# Patient Record
Sex: FEMALE | URBAN - METROPOLITAN AREA
[De-identification: names, ages, dates, MRNs, and addresses within clinical notes are randomized per-mention and may not be internally consistent; named-entity substitution may affect disease eponyms.]

---

## 2018-04-04 ENCOUNTER — HOSPITAL ENCOUNTER (EMERGENCY)
Facility: HOSPITAL | Age: 78
Discharge: HOME | End: 2018-04-05

## 2018-04-04 DIAGNOSIS — I44.7 LEFT BUNDLE BRANCH BLOCK (LBBB): ICD-10-CM

## 2018-04-04 DIAGNOSIS — R10.9 ACUTE RIGHT FLANK PAIN: Primary | ICD-10-CM

## 2018-04-04 DIAGNOSIS — R07.9 CHEST PAIN: ICD-10-CM

## 2018-04-04 PROCEDURE — 93010 ELECTROCARDIOGRAM REPORT: CPT | Mod: ,,, | Performed by: INTERNAL MEDICINE

## 2018-04-04 PROCEDURE — 93005 ELECTROCARDIOGRAM TRACING: CPT

## 2018-04-04 PROCEDURE — 99285 EMERGENCY DEPT VISIT HI MDM: CPT | Mod: 25

## 2018-04-04 PROCEDURE — 96374 THER/PROPH/DIAG INJ IV PUSH: CPT

## 2018-04-05 VITALS
RESPIRATION RATE: 16 BRPM | OXYGEN SATURATION: 98 % | HEART RATE: 73 BPM | WEIGHT: 175 LBS | HEIGHT: 65 IN | DIASTOLIC BLOOD PRESSURE: 72 MMHG | SYSTOLIC BLOOD PRESSURE: 123 MMHG | TEMPERATURE: 98 F | BODY MASS INDEX: 29.16 KG/M2

## 2018-04-05 LAB
ALBUMIN SERPL BCP-MCNC: 4.4 G/DL
ALP SERPL-CCNC: 57 U/L
ALT SERPL W/O P-5'-P-CCNC: 19 U/L
ANION GAP SERPL CALC-SCNC: 14 MMOL/L
AST SERPL-CCNC: 24 U/L
BASOPHILS # BLD AUTO: 0.02 K/UL
BASOPHILS NFR BLD: 0.2 %
BILIRUB SERPL-MCNC: 0.2 MG/DL
BILIRUB UR QL STRIP: NEGATIVE
BUN SERPL-MCNC: 20 MG/DL
CALCIUM SERPL-MCNC: 10.2 MG/DL
CHLORIDE SERPL-SCNC: 103 MMOL/L
CLARITY UR: CLEAR
CO2 SERPL-SCNC: 21 MMOL/L
COLOR UR: YELLOW
CREAT SERPL-MCNC: 1.2 MG/DL
DIFFERENTIAL METHOD: ABNORMAL
EOSINOPHIL # BLD AUTO: 0.5 K/UL
EOSINOPHIL NFR BLD: 5.3 %
ERYTHROCYTE [DISTWIDTH] IN BLOOD BY AUTOMATED COUNT: 12.6 %
EST. GFR  (AFRICAN AMERICAN): 49 ML/MIN/1.73 M^2
EST. GFR  (NON AFRICAN AMERICAN): 43 ML/MIN/1.73 M^2
GLUCOSE SERPL-MCNC: 94 MG/DL
GLUCOSE UR QL STRIP: NEGATIVE
HCT VFR BLD AUTO: 37.6 %
HGB BLD-MCNC: 12.3 G/DL
HGB UR QL STRIP: NEGATIVE
KETONES UR QL STRIP: NEGATIVE
LEUKOCYTE ESTERASE UR QL STRIP: NEGATIVE
LYMPHOCYTES # BLD AUTO: 2.8 K/UL
LYMPHOCYTES NFR BLD: 33.1 %
MCH RBC QN AUTO: 30.6 PG
MCHC RBC AUTO-ENTMCNC: 32.7 G/DL
MCV RBC AUTO: 94 FL
MONOCYTES # BLD AUTO: 0.6 K/UL
MONOCYTES NFR BLD: 6.9 %
NEUTROPHILS # BLD AUTO: 4.6 K/UL
NEUTROPHILS NFR BLD: 54.3 %
NITRITE UR QL STRIP: NEGATIVE
PH UR STRIP: 7 [PH] (ref 5–8)
PLATELET # BLD AUTO: 257 K/UL
PMV BLD AUTO: 9.1 FL
POTASSIUM SERPL-SCNC: 4.7 MMOL/L
PROT SERPL-MCNC: 8.5 G/DL
PROT UR QL STRIP: NEGATIVE
RBC # BLD AUTO: 4.02 M/UL
SODIUM SERPL-SCNC: 138 MMOL/L
SP GR UR STRIP: <=1.005 (ref 1–1.03)
TROPONIN I SERPL DL<=0.01 NG/ML-MCNC: <0.006 NG/ML
URN SPEC COLLECT METH UR: ABNORMAL
UROBILINOGEN UR STRIP-ACNC: NEGATIVE EU/DL
WBC # BLD AUTO: 8.43 K/UL

## 2018-04-05 PROCEDURE — 85025 COMPLETE CBC W/AUTO DIFF WBC: CPT

## 2018-04-05 PROCEDURE — 93005 ELECTROCARDIOGRAM TRACING: CPT

## 2018-04-05 PROCEDURE — 63600175 PHARM REV CODE 636 W HCPCS

## 2018-04-05 PROCEDURE — 81003 URINALYSIS AUTO W/O SCOPE: CPT

## 2018-04-05 PROCEDURE — 80053 COMPREHEN METABOLIC PANEL: CPT

## 2018-04-05 PROCEDURE — 84484 ASSAY OF TROPONIN QUANT: CPT

## 2018-04-05 PROCEDURE — 93010 ELECTROCARDIOGRAM REPORT: CPT | Mod: ,,, | Performed by: INTERNAL MEDICINE

## 2018-04-05 RX ORDER — ASPIRIN 325 MG
325 TABLET, DELAYED RELEASE (ENTERIC COATED) ORAL DAILY
Refills: 0 | COMMUNITY
Start: 2018-04-05 | End: 2019-04-05

## 2018-04-05 RX ORDER — LIDOCAINE 50 MG/G
1 PATCH TOPICAL DAILY
Qty: 7 PATCH | Refills: 0 | Status: SHIPPED | OUTPATIENT
Start: 2018-04-05

## 2018-04-05 RX ORDER — KETOROLAC TROMETHAMINE 30 MG/ML
15 INJECTION, SOLUTION INTRAMUSCULAR; INTRAVENOUS
Status: COMPLETED | OUTPATIENT
Start: 2018-04-05 | End: 2018-04-05

## 2018-04-05 RX ADMIN — KETOROLAC TROMETHAMINE 15 MG: 30 INJECTION, SOLUTION INTRAMUSCULAR at 01:04

## 2018-04-05 NOTE — ED PROVIDER NOTES
Encounter Date: 4/4/2018    SCRIBE #1 NOTE: I, Michael Colon, am scribing for, and in the presence of,  Dr. Steiner. I have scribed the entire note.       History     Chief Complaint   Patient presents with    Abdominal Pain     ruq abdominal pain     Time seen by provider: 10:49 PM    This is a 78 y.o. female who presents to the ED via EMS with complaint of sudden onset right flank pain this evening. Patient reports that her flank pain was radiating to her chest, but her pain has since resolved. EMS activated, and pre-hospital EKG shows sinus rhythm LBBB. The patient denies any SOB, nausea, or other concerning symptoms. Per dastamra, patient was given baby aspirin with some relief prior to arrival to Ed. The patient was comfortable upon EMS arrival, and appears comfortable in the ED. No other palliative or provocative factors except as noted.    The history of this patient was limited due to a language barrier. Patient's daughter was able to interpret between [place] and English.          Review of patient's allergies indicates:  No Known Allergies  No past medical history on file.  No past surgical history on file.  No family history on file.  Social History   Substance Use Topics    Smoking status: Not on file    Smokeless tobacco: Not on file    Alcohol use Not on file     Review of Systems   Respiratory: Negative for shortness of breath.    Cardiovascular: Positive for chest pain.   Gastrointestinal: Negative for nausea and vomiting.   Genitourinary: Positive for flank pain.   All other systems reviewed and are negative.      Physical Exam     Initial Vitals [04/04/18 2230]   BP Pulse Resp Temp SpO2   126/73 88 20 97.2 °F (36.2 °C) 99 %      MAP       90.67         Physical Exam    Nursing note and vitals reviewed.  Constitutional: She appears well-developed.   HENT:   Head: Normocephalic and atraumatic.   Eyes: EOM are normal.   Neck: Neck supple.   Cardiovascular: Normal rate and regular rhythm.    Pulmonary/Chest: Breath sounds normal. No respiratory distress.   Abdominal: Soft. There is no tenderness.   Abdomen soft, nontender   Musculoskeletal: Normal range of motion.   No acute deformity   Neurological: She is alert and oriented to person, place, and time.   Skin: Skin is warm and dry.   Psychiatric: She has a normal mood and affect.         ED Course   Procedures  Labs Reviewed   CBC W/ AUTO DIFFERENTIAL - Abnormal; Notable for the following:        Result Value    MPV 9.1 (*)     All other components within normal limits   COMPREHENSIVE METABOLIC PANEL - Abnormal; Notable for the following:     CO2 21 (*)     Total Protein 8.5 (*)     eGFR if  49 (*)     eGFR if non  43 (*)     All other components within normal limits   URINALYSIS - Abnormal; Notable for the following:     Specific Gravity, UA <=1.005 (*)     All other components within normal limits   TROPONIN I     EKG Readings: (Independently Interpreted)   Sinus rhythm at 93 bpm, , no acute concerning St or T wave changes, LBBB, Sgarbossa score 0; no significant changes from pre-hospital EKG performed approximately 40 minutes PTA; abnormal EKG    Repeat EKG at 2:08 AM  76 bpm, ; unchanged from most prior EKG     Imaging Results          CT Renal Stone Study ABD Pelvis WO (Final result)  Result time 04/05/18 02:31:52    Final result by Lalitha Spence MD (04/05/18 02:31:52)                 Impression:      1. No acute abnormality identified on today's examination.  Specifically, no evidence of obstructive uropathy.  2. Mild hepatomegaly.  3. Multiple additional findings as above.      Electronically signed by: Lalitha Spence MD  Date:    04/05/2018  Time:    02:31             Narrative:    EXAMINATION:  CT RENAL STONE STUDY ABD PELVIS WO    CLINICAL HISTORY:  Flank pain, stone disease suspected;    TECHNIQUE:  Low dose axial images, sagittal and coronal reformations were obtained from the lung bases to  the pubic symphysis.  Contrast was not administered.    COMPARISON:  None    FINDINGS:  The visualized lung bases are free of pleural fluid or focal consolidation.  The visualized portions of the heart and pericardium are unremarkable.    Evaluation of solid organ parenchyma is limited due to noncontrast technique.  The liver is prominent in size measuring approximately 17 cm in craniocaudad dimension.  No focal hepatic abnormality is identified.  The gallbladder demonstrates no evidence of radiopaque stones.  There is no intra or extrahepatic biliary ductal dilatation.  The spleen, pancreas, and adrenal glands demonstrate an unremarkable noncontrast CT appearance.    The kidneys are normal in size and location.  The ureters are normal in caliber.  There is no evidence of hydroureteronephrosis or nephroureterolithiasis.  There are multiple pelvic phleboliths present.  The uterus and adnexal regions are within normal limits.  The urinary bladder is unremarkable.    The abdominal aorta is normal in course and caliber with minimal scattered calcific atherosclerosis.  There is no bulky abdominopelvic adenopathy.  The visualized loops of large and small bowel demonstrate no evidence of obstruction or inflammatory change.  The appendix is unremarkable.  Incidental note is made of diastasis of the abdominal wall musculature.  There is no ascites, free intraperitoneal air or portal venous gas.    The visualized osseous structures demonstrate degenerative change without acute abnormality.  Extraperitoneal soft tissues demonstrate multiple calcifications within the bilateral gluteal soft tissues, possibly relating to injection granulomas.                               X-Ray Chest AP Portable (Final result)  Result time 04/05/18 02:22:19    Final result by Lalitha Spence MD (04/05/18 02:22:19)                 Impression:      No acute intrathoracic abnormality identified on this single radiographic view of the  chest.      Electronically signed by: Lalitha Spence MD  Date:    04/05/2018  Time:    02:22             Narrative:    EXAMINATION:  XR CHEST AP PORTABLE    CLINICAL HISTORY:  Chest pain, unspecified    TECHNIQUE:  Single frontal view of the chest was performed.    COMPARISON:  None    FINDINGS:  Cardiac monitoring leads overlie the chest.  The cardiomediastinal silhouette is likely within normal limits allowing for accentuation by portable AP technique.  Visualized airway is unremarkable.  The lungs appear symmetrically aerated without definite focal alveolar consolidation. No large pleural effusion or pneumothorax.  The visualized osseous structures demonstrate no acute abnormalities.                                     Medical Decision Making:   Clinical Tests:   Lab Tests: Ordered and Reviewed  Radiological Study: Ordered and Reviewed  Medical Tests: Ordered and Reviewed  ED Management:  LBBB on EKG was somewhat concerning. However, clinical picture is reassuring. I suspect this is old LBBB. Sgarbossa score 0. However, we plan to monitor patient, check lab work, etc.       3:45 AM  Patient feels better after meds; no cp during ED evaluation.  Negative troponin and lack of pieter chest pain at any time reassuring.  EKG stable over multiple repeats.  Patient will follow up with PCP and cardiology.    Patient is nontoxic appearing in the ED.  No persistent emergent issues detected.  Exam benign.  We will discharge home to follow up with PCP and cardiology.  Patient will return to the ED as needed for any deterioration or any other concerns.  Verbal discharge instructions and return precautions given.                        Clinical Impression:     1. Acute right flank pain    2. Chest pain    3. Left bundle branch block (LBBB)      IOvidio, personally performed the services described in this documentation. All medical record entries made by the scribe were at my direction and in my presence.  I have  reviewed the chart and agree that the record reflects my personal performance and is accurate and complete. Ovidio Steiner M.D.        I, Ovidio Steiner, personally performed the services described in this documentation. All medical record entries made by the scribe were at my direction and in my presence.  I have reviewed the chart and agree that the record reflects my personal performance and is accurate and complete. Ovidio Steiner MD  04/05/18 0509

## 2018-04-05 NOTE — ED NOTES
Pt and family updated on plan of care and result wait times. Linens changed, pt repositioned for comfort. Will continue to monitor.